# Patient Record
Sex: MALE | Race: WHITE
[De-identification: names, ages, dates, MRNs, and addresses within clinical notes are randomized per-mention and may not be internally consistent; named-entity substitution may affect disease eponyms.]

---

## 2023-06-12 ENCOUNTER — HOSPITAL ENCOUNTER (EMERGENCY)
Age: 38
Discharge: HOME | End: 2023-06-12
Payer: COMMERCIAL

## 2023-06-12 VITALS
DIASTOLIC BLOOD PRESSURE: 92 MMHG | HEART RATE: 92 BPM | TEMPERATURE: 97.52 F | OXYGEN SATURATION: 100 % | SYSTOLIC BLOOD PRESSURE: 146 MMHG | RESPIRATION RATE: 16 BRPM

## 2023-06-12 VITALS
DIASTOLIC BLOOD PRESSURE: 92 MMHG | RESPIRATION RATE: 16 BRPM | SYSTOLIC BLOOD PRESSURE: 146 MMHG | HEART RATE: 92 BPM | TEMPERATURE: 97.52 F | OXYGEN SATURATION: 100 %

## 2023-06-12 VITALS — DIASTOLIC BLOOD PRESSURE: 86 MMHG | SYSTOLIC BLOOD PRESSURE: 119 MMHG

## 2023-06-12 DIAGNOSIS — H92.02: Primary | ICD-10-CM

## 2023-06-12 DIAGNOSIS — H69.92: ICD-10-CM

## 2023-06-12 PROCEDURE — G0463 HOSPITAL OUTPT CLINIC VISIT: HCPCS

## 2023-06-12 PROCEDURE — 99213 OFFICE O/P EST LOW 20 MIN: CPT

## 2023-08-21 ENCOUNTER — APPOINTMENT (RX ONLY)
Dept: URBAN - METROPOLITAN AREA CLINIC 66 | Facility: CLINIC | Age: 38
Setting detail: DERMATOLOGY
End: 2023-08-21

## 2023-08-21 DIAGNOSIS — D485 NEOPLASM OF UNCERTAIN BEHAVIOR OF SKIN: ICD-10-CM

## 2023-08-21 PROBLEM — D37.01 NEOPLASM OF UNCERTAIN BEHAVIOR OF LIP: Status: ACTIVE | Noted: 2023-08-21

## 2023-08-21 PROCEDURE — ? RECOMMENDATIONS

## 2023-08-21 PROCEDURE — ? COUNSELING

## 2023-08-21 PROCEDURE — 99202 OFFICE O/P NEW SF 15 MIN: CPT

## 2023-08-21 ASSESSMENT — LOCATION SIMPLE DESCRIPTION DERM: LOCATION SIMPLE: RIGHT LIP

## 2023-08-21 ASSESSMENT — LOCATION ZONE DERM: LOCATION ZONE: LIP

## 2023-08-21 ASSESSMENT — LOCATION DETAILED DESCRIPTION DERM: LOCATION DETAILED: RIGHT INFERIOR VERMILION LIP

## 2023-08-21 NOTE — PROCEDURE: RECOMMENDATIONS
Render Risk Assessment In Note?: no
Detail Level: Zone
Recommendation Preamble: The following recommendations were made during the visit: See a dermatologic surgeon for this spot.

## 2024-12-23 ENCOUNTER — HOSPITAL ENCOUNTER (EMERGENCY)
Dept: HOSPITAL 102 - ANHED | Age: 39
Discharge: HOME | End: 2024-12-23
Payer: COMMERCIAL

## 2024-12-23 VITALS
TEMPERATURE: 98.24 F | OXYGEN SATURATION: 98 % | HEART RATE: 94 BPM | DIASTOLIC BLOOD PRESSURE: 91 MMHG | SYSTOLIC BLOOD PRESSURE: 138 MMHG | RESPIRATION RATE: 16 BRPM

## 2024-12-23 VITALS — HEART RATE: 78 BPM | TEMPERATURE: 97.8 F | RESPIRATION RATE: 18 BRPM | OXYGEN SATURATION: 99 %

## 2024-12-23 DIAGNOSIS — Z23: ICD-10-CM
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
## 2024-12-23 DIAGNOSIS — S00.01XA: Primary | ICD-10-CM

## 2024-12-23 PROCEDURE — 90471 IMMUNIZATION ADMIN: CPT

## 2024-12-23 PROCEDURE — 90715 TDAP VACCINE 7 YRS/> IM: CPT

## 2024-12-23 PROCEDURE — 99282 EMERGENCY DEPT VISIT SF MDM: CPT

## 2024-12-23 RX ADMIN — TETANUS TOXOID, REDUCED DIPHTHERIA TOXOID AND ACELLULAR PERTUSSIS VACCINE, ADSORBED 0.5 ML: 5; 2.5; 8; 8; 2.5 SUSPENSION INTRAMUSCULAR at 11:13

## 2024-12-23 NOTE — ED.WOUNDLAC
HPI - Wound/Laceration
General
Chief Complaint: Wound/Laceration
Stated Complaint: head injury
Time Seen by Provider: 12/23/24 11:00
History of Present Illness
HPI narrative: 
  Patient is a 39-year-old  male who presents to the ER with a skin tear the top of head.  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   Patient is unsure when he received his last tetanus shot.  He 
endorses a headache but denies need for pain medication here.  Patient denies any pertinent medical history related to this ER visit.
Related Data
Allergies

Allergy/AdvReac Type Severity Reaction Status Date / Time
amoxicillin (From Amoxil) Allergy  Unknown Verified 12/23/24 11:03



Review of Systems
Review of Systems:   All systems reviewed & are unremarkable except as noted in HPI and below

PMFSH
Social History
Social History (Reviewed 12/23/24 @ 11:07 by Malina Yao, MARCEL)
Smoking status:  Never smoker 
Alcohol intake:  current 



Exam
Narrative:   
GENERAL: Well appearing, well-nourished, non-toxic, in no acute distress.
HEAD: Normocephalic, small (approximately 1 cm) skin tear on the top of pt's head, bleeding controlled.
NECK: Supple. No adenopathy, no masses. 
RESPIRATORY: Airway patent, respirations nonlabored. Clear to auscultation bilaterally, no rales, rhonchi, wheezing.
CARDIOVASCULAR: Regular rate and rhythm without murmurs, rubs, or gallops. Peripheral pulses 2+ and equal bilaterally.
ABDOMINAL: Soft, nontender, nondistended, no hepatosplenomegaly. Normoactive BS. 
MUSCULOSKELETAL: Moves all extremities. Strength/ROM intact without gross deformities.
SKIN: Warm, dry, normal color. No rashes.
NEURO: A&O X3. Speech clear. Cranial nerves II-XII grossly intact. Steady gait. No ataxic movements. 
PSYCHIATRIC: Appropriate mood and affect. Normal interaction.

 

Course
Vital Signs
Vital signs: 

Vital Signs

Temperature  36.8 C   12/23/24 11:00
Pulse Rate  94   12/23/24 11:00
Respiratory Rate  16   12/23/24 11:00
Blood Pressure  138/91 H  12/23/24 11:00
Pulse Oximetry  98   12/23/24 11:00
Oxygen Delivery  Room Air   12/23/24 11:00



Temperature  36.8 C   12/23/24 11:00
Pulse Rate  94   12/23/24 11:00
Respiratory Rate  16   12/23/24 11:00
Blood Pressure  138/91 H  12/23/24 11:00
Pulse Oximetry  98   12/23/24 11:00
Oxygen Delivery  Room Air   12/23/24 11:00




MDM - Wound/Laceration
MDM Narrative
Medical decision making narrative: 
Patient is a 39-year-old  male who presents to the ER with a skin tear the top of head.  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   Patient is unsure when he received his last tetanus shot.  He 
endorses a headache but denies need for pain medication here.  Patient denies any pertinent medical history related to this ER visit.
No labs necessary.  No imaging necessary.
Patient will be given a Tdap vaccination and discharged home.   He may take Tylenol or ibuprofen at home for pain control.  Patient verbalizes understanding and is in agreement with plan. All questions answered.  Vital signs stable at time of 
discharge.  
Differential Diagnosis
Differential diagnosis: Likely laceration, abrasion and avulsion of skin

Discharge Plan
Discharge
Clinical Impression:
 Abrasion


Patient Disposition: Home, Self-Care

Condition: Stable

Instructions:  Antibiotic Form, Abrasion (ED)

Additional Instructions:
Please return to the ER with an worsening symptoms.  Follow-up with primary care provider in the next 2-3 days.  Take all medications as prescribed.

Patient Language: English

Prescriptions:
No Action
  prednisone 20 mg tablet 
   40 mg PO DAILY 5 Days Qty: 10 0RF

Follow-up/Referrals:
Paul***,MARCEL Holland [Primary Care Provider] - 

Time of Disposition: 11:10
